# Patient Record
Sex: FEMALE | Race: WHITE | Employment: OTHER | URBAN - METROPOLITAN AREA
[De-identification: names, ages, dates, MRNs, and addresses within clinical notes are randomized per-mention and may not be internally consistent; named-entity substitution may affect disease eponyms.]

---

## 2017-02-10 ENCOUNTER — TRANSFERRED RECORDS (OUTPATIENT)
Dept: HEALTH INFORMATION MANAGEMENT | Facility: CLINIC | Age: 82
End: 2017-02-10

## 2017-08-23 DIAGNOSIS — I10 HTN, GOAL BELOW 150/90: ICD-10-CM

## 2017-08-23 NOTE — TELEPHONE ENCOUNTER
Medication Detail      Disp Refills Start End AYAN   amLODIPine (NORVASC) 10 MG tablet 90 tablet 3 8/16/2016  No   Sig: Take 1 tablet (10 mg) by mouth daily         Last Office Visit with FMAURELIA, JACQUELINE or [facility] prescribing provider:  8/2/16   Future Office Visit:        BP Readings from Last 3 Encounters:   08/02/16 126/74   04/07/16 183/80   07/09/15 159/86

## 2017-08-25 RX ORDER — AMLODIPINE BESYLATE 10 MG/1
TABLET ORAL
Qty: 30 TABLET | Refills: 0 | Status: SHIPPED | OUTPATIENT
Start: 2017-08-25 | End: 2017-08-25

## 2017-08-25 RX ORDER — AMLODIPINE BESYLATE 10 MG/1
10 TABLET ORAL DAILY
Qty: 90 TABLET | Refills: 0 | Status: SHIPPED | OUTPATIENT
Start: 2017-08-25 | End: 2017-12-04

## 2017-08-25 NOTE — TELEPHONE ENCOUNTER
Pended 90 day supply.  This is not on refill prot for 90 days.  Last ov was 8/2/17.  KEIRY Cardoza

## 2017-08-25 NOTE — TELEPHONE ENCOUNTER
Spoke with patient and she takes this medication along with another medication and that other one was filled for 90 tablets. Patient can't understand why the other would be filled for only 30 tablets.  She plains on coming in to be seen in October when the flu shot's are ready to be given and that is when she would plain on seeing Dr Villagran at that time, so she is asking if this amount that was filled of this medication can be changed to being 90 tablets. She has to rely on other people to get her to her appointments and this would be more convenient for her as far as getting someone to take her to her doctor's appointment in October sometime

## 2017-08-25 NOTE — TELEPHONE ENCOUNTER
Spoke with patient to let her know quantity changed for her from 30 to 90 pills to refill for her

## 2017-10-27 DIAGNOSIS — I10 ESSENTIAL HYPERTENSION WITH GOAL BLOOD PRESSURE LESS THAN 140/90: ICD-10-CM

## 2017-10-27 RX ORDER — LISINOPRIL 5 MG/1
5 TABLET ORAL DAILY
Qty: 90 TABLET | Refills: 0 | Status: SHIPPED | OUTPATIENT
Start: 2017-10-27 | End: 2020-12-15

## 2017-10-27 NOTE — TELEPHONE ENCOUNTER
Dr. Villagran-Please review and advise.  Writer planned on informing patient she is welcome to see any provider in clinic.    Thank you!  MAURO PenaN, RN

## 2017-10-27 NOTE — TELEPHONE ENCOUNTER
I've been recommending Dr. Joel Wegener MD and Lydia Jones NP, but certainly anyone here would be happy to see her and would take great care of her!    Sincerely,  Dr. Jo Ann Villagran MD  10/27/2017

## 2017-10-27 NOTE — TELEPHONE ENCOUNTER
Called patient and reviewed message per below from Dr. Villagran.    Patient verbalized understanding, stated she needs to check with her daughter, who provides ride to clinic and will then schedule with WEN Jones CNP.    Patient requesting 90 day refill for Lisinopril.  Writer unable to authorize as patient overdue for annual office visit.    Dr. Villagran-Please sign if agree and may close encounter.    Thank you!  ALVA Pena, RN    BP Readings from Last 6 Encounters:   08/02/16 126/74   04/07/16 183/80   07/09/15 159/86   05/30/14 150/78   12/18/13 150/70   10/03/13 140/82

## 2017-10-27 NOTE — TELEPHONE ENCOUNTER
Please call patient today.  Would like to know who you would recommend her to see while Dr Villagran on maternity leave.  OK to leave message on voicemail.

## 2017-12-04 DIAGNOSIS — I10 HTN, GOAL BELOW 150/90: ICD-10-CM

## 2017-12-05 RX ORDER — AMLODIPINE BESYLATE 10 MG/1
TABLET ORAL
Qty: 30 TABLET | Refills: 0 | Status: SHIPPED | OUTPATIENT
Start: 2017-12-05 | End: 2020-12-15

## 2017-12-05 NOTE — TELEPHONE ENCOUNTER
Medication Detail      Disp Refills Start End AYAN   amLODIPine (NORVASC) 10 MG tablet 90 tablet 0 8/25/2017  No   Sig: Take 1 tablet (10 mg) by mouth daily     lov 11/24/17

## 2017-12-05 NOTE — TELEPHONE ENCOUNTER
Last office visit 8/2/2016    Medication is being filled for 1 time refill only due to:  Patient needs labs Creatinine. Patient needs to be seen because BP check. Patient needs to be seen because it has been more than one year since last visit.     Signed Prescriptions:                        Disp   Refills    amLODIPine (NORVASC) 10 MG tablet          30 tab*0        Sig: TAKE ONE TABLET BY MOUTH EVERY DAY  Authorizing Provider: BRENTON KENNEDY  Ordering User: VERONIKA MCNEILL      Routing to  Reception - advise overdue for annual office visit    Thank you,  Veronika Mcneill RN

## 2017-12-06 NOTE — TELEPHONE ENCOUNTER
Spoke to patient and is aware she needs to schedule but has to figure out a ride first will call back

## 2020-12-15 ENCOUNTER — VIRTUAL VISIT (OUTPATIENT)
Dept: FAMILY MEDICINE | Facility: CLINIC | Age: 85
End: 2020-12-15
Payer: COMMERCIAL

## 2020-12-15 DIAGNOSIS — E55.9 VITAMIN D DEFICIENCY: ICD-10-CM

## 2020-12-15 DIAGNOSIS — R73.01 IMPAIRED FASTING GLUCOSE: ICD-10-CM

## 2020-12-15 DIAGNOSIS — R80.9 MICROALBUMINURIA: ICD-10-CM

## 2020-12-15 DIAGNOSIS — Z83.3 FAMILY HISTORY OF DIABETES MELLITUS: ICD-10-CM

## 2020-12-15 DIAGNOSIS — Z13.6 CARDIOVASCULAR SCREENING; LDL GOAL LESS THAN 160: ICD-10-CM

## 2020-12-15 DIAGNOSIS — I10 HTN, GOAL BELOW 150/90: ICD-10-CM

## 2020-12-15 DIAGNOSIS — Z00.00 ENCOUNTER FOR MEDICARE ANNUAL WELLNESS EXAM: Primary | ICD-10-CM

## 2020-12-15 PROCEDURE — G0439 PPPS, SUBSEQ VISIT: HCPCS | Mod: 95 | Performed by: FAMILY MEDICINE

## 2020-12-15 NOTE — PATIENT INSTRUCTIONS
Please schedule a fasting lab appointment at [facility], thank you!    Address: [address], [place] [address]    Phone: [phone]     Patient Education   Personalized Prevention Plan  You are due for the preventive services outlined below.  Your care team is available to assist you in scheduling these services.  If you have already completed any of these items, please share that information with your care team to update in your medical record.  Health Maintenance Due   Topic Date Due     Zoster (Shingles) Vaccine (1 of 2) 10/16/1979     Annual Wellness Visit  10/16/1994     A1C Lab  08/02/2017     Basic Metabolic Panel  08/02/2017     Cholesterol Lab  08/02/2017     Kidney Microalbumin Urine Test  08/02/2017     FALL RISK ASSESSMENT  08/02/2017     Discuss Advance Care Planning  11/09/2017     PHQ-2  01/01/2020     Flu Vaccine (1) 09/01/2020        Patient Education   Personalized Prevention Plan  You are due for the preventive services outlined below.  Your care team is available to assist you in scheduling these services.  If you have already completed any of these items, please share that information with your care team to update in your medical record.  Health Maintenance Due   Topic Date Due     Zoster (Shingles) Vaccine (1 of 2) 10/16/1979     A1C Lab  08/02/2017     Basic Metabolic Panel  08/02/2017     Cholesterol Lab  08/02/2017     Kidney Microalbumin Urine Test  08/02/2017     FALL RISK ASSESSMENT  08/02/2017     Discuss Advance Care Planning  11/09/2017     PHQ-2  01/01/2020     Flu Vaccine (1) 09/01/2020     Preventive Health Recommendations    See your health care provider every year to    Review health changes.     Discuss preventive care.      Review your medicines if your doctor has prescribed any.    You no longer need a yearly Pap test unless you've had an abnormal Pap test in the past 10 years. If you have vaginal symptoms, such as bleeding or discharge, be sure to talk  with your provider about a Pap test.    Every 1 to 2 years, have a mammogram.  If you are over 69, talk with your health care provider about whether or not you want to continue having screening mammograms.    Every 10 years, have a colonoscopy. Or, have a yearly FIT test (stool test). These exams will check for colon cancer.     Have a cholesterol test every 5 years, or more often if your doctor advises it.     Have a diabetes test (fasting glucose) every three years. If you are at risk for diabetes, you should have this test more often.     At age 65, have a bone density scan (DEXA) to check for osteoporosis (brittle bone disease).    Shots:    Get a flu shot each year.    Get a tetanus shot every 10 years.    Talk to your doctor about your pneumonia vaccines. There are now two you should receive - Pneumovax (PPSV 23) and Prevnar (PCV 13).    Talk to your pharmacist about the shingles vaccine.    Talk to your doctor about the hepatitis B vaccine.    Nutrition:     Eat at least 5 servings of fruits and vegetables each day.    Eat whole-grain bread, whole-wheat pasta and brown rice instead of white grains and rice.    Get adequate Calcium and Vitamin D.     Lifestyle    Exercise at least 150 minutes a week (30 minutes a day, 5 days a week). This will help you control your weight and prevent disease.    Limit alcohol to one drink per day.    No smoking.     Wear sunscreen to prevent skin cancer.     See your dentist twice a year for an exam and cleaning.    See your eye doctor every 1 to 2 years to screen for conditions such as glaucoma, macular degeneration and cataracts.    Personalized Prevention Plan  You are due for the preventive services outlined below.  Your care team is available to assist you in scheduling these services.  If you have already completed any of these items, please share that information with your care team to update in your medical record.  Health Maintenance   Topic Date Due     ZOSTER  IMMUNIZATION (1 of 2) 10/16/1979     A1C  08/02/2017     BMP  08/02/2017     LIPID  08/02/2017     MICROALBUMIN  08/02/2017     FALL RISK ASSESSMENT  08/02/2017     ADVANCE CARE PLANNING  11/09/2017     PHQ-2  01/01/2020     INFLUENZA VACCINE (1) 09/01/2020     MEDICARE ANNUAL WELLNESS VISIT  12/15/2021     DTAP/TDAP/TD IMMUNIZATION (2 - Td) 04/07/2026     Pneumococcal Vaccine: 65+ Years  Completed     Pneumococcal Vaccine: Pediatrics (0 to 5 Years) and At-Risk Patients (6 to 64 Years)  Aged Out     IPV IMMUNIZATION  Aged Out     MENINGITIS IMMUNIZATION  Aged Out     HEPATITIS B IMMUNIZATION  Aged Out

## 2020-12-15 NOTE — PROGRESS NOTES
"  SUBJECTIVE:   Radha Manning is a 89 year old female who presents for Preventive Visit.  This was done with a telephone visit.  Radha Manning is a 89 year old female who is being evaluated via a billable telephone visit.      The patient has been notified of following:     \"This telephone visit will be conducted via a call between you and your physician/provider. We have found that certain health care needs can be provided without the need for a physical exam.  This service lets us provide the care you need with a short phone conversation.  If a prescription is necessary we can send it directly to your pharmacy.  If lab work is needed we can place an order for that and you can then stop by our lab to have the test done at a later time.    Telephone visits are billed at different rates depending on your insurance coverage. During this emergency period, for some insurers they may be billed the same as an in-person visit.  Please reach out to your insurance provider with any questions.    If during the course of the call the physician/provider feels a telephone visit is not appropriate, you will not be charged for this service.\"    Patient has given verbal consent for Telephone visit?  Yes        Patient has been advised of split billing requirements and indicates understanding: Yes  Are you in the first 12 months of your Medicare Part B coverage?  No    Her son Yunier provides corroborating information today.    Physical Health:    In general, how would you rate your overall physical health? Fair    Still lives by herself in the same house she's had for more than 60 years    She does feel her balance has deteriorated a little, she likes to put her hand on a table or chair as she moves around her house    She fell 6 weeks ago, fell while reaching for something on her armoire in the middle of the night, she had trouble getting back up. She has a life alert necklace, paramedics came to her house and helped her up, she " "wasn't hurt. She didn't have any injuries.    Outside of work, how many days during the week do you exercise? none    Outside of work, approximately how many minutes a day do you exercise?less than 15 minutes    If you drink alcohol do you typically have >3 drinks per day or >7 drinks per week? No    Do you usually eat at least 4 servings of fruit and vegetables a day, include whole grains & fiber and avoid regularly eating high fat or \"junk\" foods? Yes    Do you have any problems taking medications regularly?  No    Do you have any side effects from medications? none    Needs assistance for the following daily activities: shopping and housework    Which of the following safety concerns are present in your home?  none identified     Hearing impairment: Yes, Need to ask people to speak up or repeat themselves.    Mental Health:    In general, how would you rate your overall mental or emotional health? good  PHQ-2 Score:      Do you feel safe in your environment? Yes    Have you ever done Advance Care Planning? (For example, a Health Directive, POLST, or a discussion with a medical provider or your loved ones about your wishes): Yes, advance care planning is on file.    Fall risk:  Fallen 2 or more times in the past year?: No  Any fall with injury in the past year?: No    Cognitive Screening:   No memory deficits reported by son, she pays her own bills, no financial errors, concerns about money management    Mini-CogTM Copyright AMELIA Colón. Licensed by the author for use in [facility]; reprinted with permission (hao@[facility]). All rights reserved.      Impaired fasting glucose Follow-up      Patient is checking blood sugars: not at all    Diabetic concerns: None     Symptoms of hypoglycemia (low blood sugar): none     Paresthesias (numbness or burning in feet) or sores: No      Lab Results   Component Value Date    A1C 5.4 08/02/2016              Hyperlipidemia Follow-Up      Are you regularly taking any " medication or supplement to lower your cholesterol?   No    Are you having muscle aches or other side effects that you think could be caused by your cholesterol lowering medication?  NA    Hypertension Follow-up      Do you check your blood pressure regularly outside of the clinic? No     Are you following a low salt diet? Yes    Are your blood pressures ever more than 140 on the top number (systolic) OR more   than 90 on the bottom number (diastolic), for example 140/90? No    BP Readings from Last 2 Encounters:   08/02/16 126/74   04/07/16 183/80     Hemoglobin A1C (%)   Date Value   08/02/2016 5.4     LDL Cholesterol Calculated (mg/dL)   Date Value   08/02/2016 128 (H)   07/09/2015 115       Chronic Kidney Disease Follow-up; microalbumuria      Do you take any over the counter pain medicine?: aspirin and tylenol daily, no nsaids  GFR Estimate   Date Value Ref Range Status   08/02/2016 84 >60 mL/min/1.7m2 Final     Comment:     Non  GFR Calc   07/09/2015 >90  Non  GFR Calc   >60 mL/min/1.7m2 Final   08/01/2013 80 >60 mL/min/1.7m2 Final         Reviewed and updated as needed this visit by clinical staff   Allergies  Meds  Problems             Reviewed and updated as needed this visit by Provider   Allergies  Meds  Problems            Social History     Tobacco Use     Smoking status: Never Smoker     Smokeless tobacco: Never Used   Substance Use Topics     Alcohol use: Yes     Comment: 2 times a year a glass of wine                           Current providers sharing in care for this patient include:   Patient Care Team:  Jo Ann Villagran MD as PCP - General (Family Practice)    The following health maintenance items are reviewed in Epic and correct as of today:  Health Maintenance   Topic Date Due     ZOSTER IMMUNIZATION (1 of 2) 10/16/1979     A1C  08/02/2017     BMP  08/02/2017     LIPID  08/02/2017     MICROALBUMIN  08/02/2017     FALL RISK ASSESSMENT  08/02/2017      ADVANCE CARE PLANNING  2017     INFLUENZA VACCINE (1) 2020     MEDICARE ANNUAL WELLNESS VISIT  12/15/2021     DTAP/TDAP/TD IMMUNIZATION (2 - Td) 2026     PHQ-2  Completed     Pneumococcal Vaccine: 65+ Years  Completed     Pneumococcal Vaccine: Pediatrics (0 to 5 Years) and At-Risk Patients (6 to 64 Years)  Aged Out     IPV IMMUNIZATION  Aged Out     MENINGITIS IMMUNIZATION  Aged Out     HEPATITIS B IMMUNIZATION  Aged Out     BP Readings from Last 3 Encounters:   16 126/74   16 183/80   07/09/15 159/86    Wt Readings from Last 3 Encounters:   16 53.5 kg (118 lb)   16 54 kg (119 lb)   07/09/15 54.9 kg (121 lb)                  Patient Active Problem List   Diagnosis     Osteoporosis     Insomnia     Malignant neoplasm of colon (H)     Family history of diabetes mellitus     Unspecified visual loss     CARDIOVASCULAR SCREENING; LDL GOAL LESS THAN 160     Blindness     Ulcer of toe (H)     Hard of hearing, unspecified laterality     Impaired fasting glucose     Vitamin D deficiency     Microalbuminuria     HTN, goal below 150/90     Primary osteoarthritis of left knee     Past Surgical History:   Procedure Laterality Date     ZZC NONSPECIFIC PROCEDURE      colon ca resection       Social History     Tobacco Use     Smoking status: Never Smoker     Smokeless tobacco: Never Used   Substance Use Topics     Alcohol use: Yes     Comment: 2 times a year a glass of wine     Family History   Problem Relation Age of Onset     Hypertension Mother          93     Cancer - colorectal Father      Cancer - colorectal Sister      Diabetes Sister         +cardiomyopathy         Current Outpatient Medications   Medication Sig Dispense Refill     acetaminophen (TYLENOL) 500 MG tablet Take 2 tablets (1,000 mg) by mouth At Bedtime 100 tablet 0     ASPIRIN 81 MG OR CPEP 1 CAPSULE DAILY 30 0     Calcium-Vitamin D (CALCIUM + D PO) Take 2 tablets by mouth daily.       MULTI-VITAMIN OR TABS Take by  "mouth 3 times daily.   0     ORDER FOR DME Equipment being ordered: post op shoe 1 Device 0     fluorometholone (FML LIQUIFILM) 0.1 % ophthalmic suspension 1 drop daily        No Known Allergies  Recent Labs   Lab Test 08/02/16  0917 07/09/15  0934 08/01/13  0901 11/09/12  0846   A1C 5.4  --   --   --    * 115  --  137*   HDL 75 87  --  81   TRIG 73 70  --  74   ALT 15 20 20 7   CR 0.67 0.58 0.70 0.64   GFRESTIMATED 84 >90  Non  GFR Calc   80 89   GFRESTBLACK >90   GFR Calc   >90   GFR Calc   >90 >90   POTASSIUM 3.8 3.7 4.1 3.8   TSH  --  1.31  --   --       ROS:  Constitutional, HEENT, cardiovascular, pulmonary, GI, , musculoskeletal, neuro, skin, endocrine and psych systems are negative, except as otherwise noted.    OBJECTIVE:   There were no vitals taken for this visit. Estimated body mass index is 23.83 kg/m  as calculated from the following:    Height as of 4/7/16: 1.499 m (4' 11\").    Weight as of 8/2/16: 53.5 kg (118 lb).  EXAM:   GENERAL: healthy, alert and no distress  RESP: no increased work of breathing, able to talk in complete sentences  NEURO: mentation intact and oriented times three  PSYCH: mentation appears normal, affect normal/bright and judgement and insight intact    Diagnostic Test Results:  Labs reviewed in Epic    ASSESSMENT / PLAN:   1. Encounter for Medicare annual wellness exam  Routine, very healthy    2. Family history of diabetes mellitus  - Hemoglobin A1c; Future    3. CARDIOVASCULAR SCREENING; LDL GOAL LESS THAN 160  - Lipid panel reflex to direct LDL Fasting; Future    4. Impaired fasting glucose  - Hemoglobin A1c; Future    5. Vitamin D deficiency  - Vitamin D Deficiency; Future    6. Microalbuminuria  - Comprehensive metabolic panel; Future  - Albumin Random Urine Quantitative with Creat Ratio; Future    7. HTN, goal below 150/90  BP Readings from Last 3 Encounters:   08/02/16 126/74   04/07/16 183/80   07/09/15 159/86    at " "goal, but not checked in over 4 years, recommended checking at home  - Comprehensive metabolic panel; Future  - Albumin Random Urine Quantitative with Creat Ratio; Future    Patient has been advised of split billing requirements and indicates understanding: Yes    COUNSELING:  Reviewed preventive health counseling, as reflected in patient instructions    Estimated body mass index is 23.83 kg/m  as calculated from the following:    Height as of 4/7/16: 1.499 m (4' 11\").    Weight as of 8/2/16: 53.5 kg (118 lb).        She reports that she has never smoked. She has never used smokeless tobacco.       Phone call duration: 22 minutes    Jo Ann Villagran MD        Appropriate preventive services were discussed with this patient, including applicable screening as appropriate for cardiovascular disease, diabetes, osteopenia/osteoporosis, and glaucoma.  As appropriate for age/gender, discussed screening for colorectal cancer, prostate cancer, breast cancer, and cervical cancer. Checklist reviewing preventive services available has been given to the patient.    Reviewed patients plan of care and provided an AVS. The Intermediate Care Plan ( asthma action plan, low back pain action plan, and migraine action plan) for Radha meets the Care Plan requirement. This Care Plan has been established and reviewed with the Patient.    Counseling Resources:  ATP IV Guidelines  Pooled Cohorts Equation Calculator  Breast Cancer Risk Calculator  BRCA-Related Cancer Risk Assessment: FHS-7 Tool  FRAX Risk Assessment  ICSI Preventive Guidelines  Dietary Guidelines for Americans, 2010  USDA's MyPlate  ASA Prophylaxis  Lung CA Screening    Jo Ann Villagran MD  [facility]  "

## 2020-12-15 NOTE — PROGRESS NOTES
"Radha Manning is a 89 year old female who is being evaluated via a billable video visit.      The patient has been notified of following:     \"This video visit will be conducted via a call between you and your physician/provider. We have found that certain health care needs can be provided without the need for an in-person physical exam.  This service lets us provide the care you need with a video conversation.  If a prescription is necessary we can send it directly to your pharmacy.  If lab work is needed we can place an order for that and you can then stop by our lab to have the test done at a later time.    Video visits are billed at different rates depending on your insurance coverage.  Please reach out to your insurance provider with any questions.    If during the course of the call the physician/provider feels a video visit is not appropriate, you will not be charged for this service.\"    Patient has given verbal consent for Video visit? Yes  How would you like to obtain your AVS? Mail a copy  If you are dropped from the video visit, the video invite should be resent to: Text to cell phone: [phone]  Will anyone else be joining your video visit? No    Subjective     Radha Manning is a 89 year old female who presents today via video visit for the following health issues:    HPI     Annual Wellness Visit    Patient has been advised of split billing requirements and indicates understanding: Yes     Are you in the first 12 months of your Medicare Part B coverage?  No    Physical Health:    In general, how would you rate your overall physical health? excellent    Outside of work, how many days during the week do you exercise?6-7 days/week    Outside of work, approximately how many minutes a day do you exercise?less than 15 minutes    If you drink alcohol do you typically have >3 drinks per day or >7 drinks per week? No    Do you usually eat at least 4 servings of fruit and vegetables a day, include whole " "grains & fiber and avoid regularly eating high fat or \"junk\" foods? Yes    Do you have any problems taking medications regularly? No    Do you have any side effects from medications? none    Needs assistance for the following daily activities: shopping    Which of the following safety concerns are present in your home?  none identified     Hearing impairment: Yes, Difficulty following a conversation in a noisy restaurant or crowded room.    Feel that people are mumbling or not speaking clearly.    Need to ask people to speak up or repeat themselves.    Difficulty understanding speech on the telephone.    In the past 6 months, have you been bothered by leaking of urine? yes    There were no vitals taken for this visit.    Mental Health:    In general, how would you rate your overall mental or emotional health? excellent  PHQ-2 Score:  0    Do you feel safe in your environment? No   0  "

## 2020-12-22 ENCOUNTER — TELEPHONE (OUTPATIENT)
Dept: FAMILY MEDICINE | Facility: CLINIC | Age: 85
End: 2020-12-22

## 2020-12-22 DIAGNOSIS — H54.7 UNSPECIFIED VISUAL LOSS: ICD-10-CM

## 2020-12-22 DIAGNOSIS — R73.01 IMPAIRED FASTING GLUCOSE: ICD-10-CM

## 2020-12-22 DIAGNOSIS — M17.12 PRIMARY OSTEOARTHRITIS OF LEFT KNEE: ICD-10-CM

## 2020-12-22 DIAGNOSIS — I10 HTN, GOAL BELOW 150/90: ICD-10-CM

## 2020-12-22 DIAGNOSIS — H54.7 BLINDNESS: ICD-10-CM

## 2020-12-22 DIAGNOSIS — M81.0 OSTEOPOROSIS: ICD-10-CM

## 2020-12-22 DIAGNOSIS — H91.90 HEARING LOSS: ICD-10-CM

## 2020-12-22 DIAGNOSIS — Z13.6 CARDIOVASCULAR SCREENING; LDL GOAL LESS THAN 160: Primary | ICD-10-CM

## 2020-12-22 DIAGNOSIS — E55.9 VITAMIN D DEFICIENCY: ICD-10-CM

## 2020-12-22 NOTE — TELEPHONE ENCOUNTER
SOn states that patient needs blood work but refuses to leave the house and wonders if there is a way she can get orders for a home care nurse to come out to the house and do it.  Please call.    Thank you.

## 2020-12-22 NOTE — TELEPHONE ENCOUNTER
Hi, yes, that would be great if someone could go draw blood, future orders already placed for vitamin D, lipid, CMP, and HgA1C. She also needs urine microalbumin check.    Sincerely,  Dr. Jo Ann Villagran MD  12/22/2020

## 2020-12-31 ENCOUNTER — PATIENT OUTREACH (OUTPATIENT)
Dept: BEHAVIORAL HEALTH | Facility: CLINIC | Age: 85
End: 2020-12-31

## 2020-12-31 NOTE — PROGRESS NOTES
The Community Paramedic left a message for a call back about a blood draw.  Will try again in 1-2 business days.    Krystle BAEZA, SABIHAP  Community Paramedic  Phone number [phone]  Email Sophia [email or web address]

## 2021-01-04 ENCOUNTER — PATIENT OUTREACH (OUTPATIENT)
Dept: BEHAVIORAL HEALTH | Facility: CLINIC | Age: 86
End: 2021-01-04

## 2021-01-04 NOTE — PROGRESS NOTES
Called and left second message for a call back to schedule Community Paramedic appointment.  Will reach out again tomorrow.    Krystle BAEZA, NRP  Community Paramedic  Phone number [phone]  Email Sophia [email or web address]

## 2021-01-05 ENCOUNTER — PATIENT OUTREACH (OUTPATIENT)
Dept: BEHAVIORAL HEALTH | Facility: CLINIC | Age: 86
End: 2021-01-05

## 2021-01-05 NOTE — PROGRESS NOTES
A message was left for a call back for the pt son about scheduling his mother for a blood draw. Will reach out again in 1-2 business days.    Krystle BAEZA, P  Community Paramedic  Phone number [phone]  Email Sophia [email or web address]

## 2021-01-07 ENCOUNTER — PATIENT OUTREACH (OUTPATIENT)
Dept: BEHAVIORAL HEALTH | Facility: CLINIC | Age: 86
End: 2021-01-07

## 2021-01-07 NOTE — PROGRESS NOTES
4th reach out to schedule appointment for blood draw.  3 calls to the pt and 1 to her son.  No more attempts will be made and the pt will be closed out. Please send another referral as needed.    Krystle BAEZA, SABIHAP  Community Paramedic  Phone number [phone]  Email Sophia [email or web address]

## 2021-08-11 ENCOUNTER — NURSE TRIAGE (OUTPATIENT)
Dept: NURSING | Facility: CLINIC | Age: 86
End: 2021-08-11

## 2021-08-11 NOTE — TELEPHONE ENCOUNTER
Home care agency requesting Fax number in order to fax form to request orders.    Fax number given to the [facility].    Shama Ely RN  [place] Nurse Advisor  3:00 PM  8/11/2021

## 2021-08-13 ENCOUNTER — TELEPHONE (OUTPATIENT)
Dept: FAMILY MEDICINE | Facility: CLINIC | Age: 86
End: 2021-08-13

## 2021-08-13 NOTE — TELEPHONE ENCOUNTER
Nurse has not received orders that were faxed to the clinic for start of care.   Trx to  to assist.       Sussy Whitlock RN   [facility]

## 2021-08-17 ENCOUNTER — VIRTUAL VISIT (OUTPATIENT)
Dept: FAMILY MEDICINE | Facility: CLINIC | Age: 86
End: 2021-08-17
Payer: COMMERCIAL

## 2021-08-17 DIAGNOSIS — Z13.6 CARDIOVASCULAR SCREENING; LDL GOAL LESS THAN 160: ICD-10-CM

## 2021-08-17 DIAGNOSIS — R54 FRAIL ELDERLY: ICD-10-CM

## 2021-08-17 DIAGNOSIS — I10 HTN, GOAL BELOW 150/90: Primary | ICD-10-CM

## 2021-08-17 DIAGNOSIS — R73.01 IMPAIRED FASTING GLUCOSE: ICD-10-CM

## 2021-08-17 PROCEDURE — 99213 OFFICE O/P EST LOW 20 MIN: CPT | Mod: 95 | Performed by: FAMILY MEDICINE

## 2021-08-17 NOTE — PROGRESS NOTES
Radha is a 89 year old who is being evaluated via a billable video visit.      How would you like to obtain your AVS? Mail a copy  If the video visit is dropped, the invitation should be resent by: Send to e-mail at: [email or web address]  Will anyone else be joining your video visit? No  Video Start Time: 2:24 PM    Assessment & Plan     HTN, goal below 150/90  At goal  - Basic metabolic panel; Future  - Albumin Random Urine Quantitative with Creat Ratio; Future    CARDIOVASCULAR SCREENING; LDL GOAL LESS THAN 160  Return to clinic for fasting labs  - Lipid panel reflex to direct LDL Fasting; Future    Impaired fasting glucose  - Hemoglobin A1c; Future    Frail elderly  Son Penelope reports increasing difficulty managing hygiene at home, needing more assistance. They have found [facility] services, which provides physical therapy, OT, PCA and RN services. I do recommend this and wrote letter in support see letter.    Motivational Interviewing  Target Behavior: encouraged getting Covid vaccine    Stage of Change: PRECONTEMPLATION (Not seeing need for change)    MI Intervention: Permission to raise concern or advise and Reflections: simple and complex     I discussed the high risk of covid, to patient and to family members and loved ones, that they could contract covid and die, have a long term serious illness, and permanent post covid, and they could be responsible for passing a lethal case of covid to a loved one. I discussed patient who lost leg above the knee due to covid related sepsis, and many, many patients who have been intubated for weeks with long term, slow recovery, including healthy young people. For Radha, especially, at 89, taz serious covid could lead to some pretty serious decisions about whether to intubate or not.    I strongly encouraged Radha and all eligible family members to get vaccinated against covid.     No follow-ups on file.    Jo Ann Villagran MD  [facility]  [facility]   Radha is a 89 year old who presents for the following health issues     HPI     Frail Elderly  Radha is a 89 year old female who is having more trouble with some ADLs including bathing, dressing safely, and home cares. Her son would like her to have get some services with [facility] who provide nursing and PCA services.    Hypertension Follow-up      Do you check your blood pressure regularly outside of the clinic? No     Are you following a low salt diet? No    Are your blood pressures ever more than 140 on the top number (systolic) OR more   than 90 on the bottom number (diastolic), for example 140/90? No    Chronic Kidney Disease Follow-up    Do you take any over the counter pain medicine?: Yes  What over the counter medicine are you taking for your pain?:  Tylenol and Aspirin       How often do you take this medicine?:  One time daily      How many servings of fruits and vegetables do you eat daily?  2-3    On average, how many sweetened beverages do you drink each day (Examples: soda, juice, sweet tea, etc.  Do NOT count diet or artificially sweetened beverages)?   1    How many days per week do you exercise enough to make your heart beat faster? 3 or less    How many minutes a day do you exercise enough to make your heart beat faster? 9 or less    How many days per week do you miss taking your medication? 0        Review of Systems   Constitutional, HEENT, cardiovascular, pulmonary, GI, , musculoskeletal, neuro, skin, endocrine and psych systems are negative, except as otherwise noted.      Objective           Vitals:  No vitals were obtained today due to virtual visit.    Physical Exam   GENERAL: healthy, alert, no distress, frail and elderly  EYES: Eyes grossly normal to inspection.  No discharge or erythema, or obvious scleral/conjunctival abnormalities.  RESP: No audible wheeze, cough, or visible cyanosis.  No visible retractions or increased work of breathing.    SKIN:  Visible skin clear. No significant rash, abnormal pigmentation or lesions.  NEURO: resting tremor mouth, alert and orientated, son Yunier provides most of the history  PSYCH: affect normal/bright                Video-Visit Details    Type of service:  Video Visit    Video End Time:2:56 PM    Originating Location (pt. Location): Home    Distant Location (provider location):  [facility]     Platform used for Video Visit: [id number]

## 2021-08-17 NOTE — LETTER
[facility]  [address], [place] [address]  Phone: [phone]      August 17, 2021    SIS MANNING  [address] [place] [id number]    To Whom it May Concern,    Sis Manning is a 89 year female who is frail and having difficulty managing ADLs including bathing and dressing. She is at risk for injury from falls. Please assess to see for PCA, OT and physical therapy services in her home for personal hygiene care, and other services needed for home safety and health.    Sincerely,    Sincerely,  Dr. Jo Ann Villagran MD  8/17/2021

## 2021-08-19 ENCOUNTER — TELEPHONE (OUTPATIENT)
Dept: FAMILY MEDICINE | Facility: CLINIC | Age: 86
End: 2021-08-19

## 2021-08-19 NOTE — TELEPHONE ENCOUNTER
"No consent to communicate on file .    Daughter Damaris  called . She just picked up the form from clinic today for consent to communicate    1.Issue her mom has is her anxiety, it has gotten worse, it is accelerated, \"more intense than it was\" \"even to go outside \" it is difficult. She admitted to her daughter that she has anxiety.     She is wondering if they can get any anxiety medication for her. She knows her brother didn't mention the anxiety at the visit yesterday.     Advised daughter to do phone visit with Dr Villagran and her mother and this was scheduled for next week. They can discuss this directly with DR Villagran.       2. Also her mom asked for covid vaccine a couple months ago. She is wondering if a visiting nurses are doing this in the home     Daughter advised call the [facility] COVID-19 Public Hotline at 1-806.656.6926 for more vaccine options rather than this clinic or pharmacy.     Charlene Gayle RN, BSN  [facility]           "

## 2021-08-19 NOTE — TELEPHONE ENCOUNTER
Left message on [facility] RN voicemail giving verbal authorization for requested home care orders.    ALVA Hardwick, RN  [facility]

## 2021-08-19 NOTE — TELEPHONE ENCOUNTER
Reason for Call: Other order    Detailed comments: Verbal orders for skilled nursing and OT. Please follow up. Thanks!    Phone Number Patient can be reached at: [phone]    Best Time: Any    Can we leave a detailed message on this number? YES    Call taken on 8/19/2021 at 3:21 PM by Monae Nina

## 2021-08-19 NOTE — TELEPHONE ENCOUNTER
Reason for Call: Other call back    Detailed comments: Patients daughter is requesting a call back from PCP. States she is concerned about her mom's anxiety and would like to relay some information. Please assist. Thanks!    Phone Number Patient can be reached at: [phone]    Best Time: Any    Can we leave a detailed message on this number? YES    Call taken on 8/19/2021 at 12:32 PM by Monae Nina

## 2021-08-23 ENCOUNTER — TELEPHONE (OUTPATIENT)
Dept: FAMILY MEDICINE | Facility: CLINIC | Age: 86
End: 2021-08-23

## 2021-08-23 ENCOUNTER — MEDICAL CORRESPONDENCE (OUTPATIENT)
Dept: HEALTH INFORMATION MANAGEMENT | Facility: CLINIC | Age: 86
End: 2021-08-23
Payer: COMMERCIAL

## 2021-08-23 NOTE — TELEPHONE ENCOUNTER
Reason for Call:  Home Health Care    Melany with [facility] called regarding (reason for call): orders    Orders are needed for this patient. Delay and start home care on today 8-23-21 starting at 3:00 pm     PT: yes    OT: yes    Skilled Nursing: yes     Pt Provider: Dr Villagran    Phone Number Homecare Nurse can be reached at: [phone]    Can we leave a detailed message on this number? YES    Phone number patient can be reached at:     Best Time:     Call taken on 8/23/2021 at 8:21 AM by Steffanie Garcia

## 2021-08-23 NOTE — TELEPHONE ENCOUNTER
I gave the order for delay of care.  This was because they couldn't get a hold of family.    (Per medicare, if visit is done 48 hours after the order was received, they need an order for delay of care.)  KEIRY Cardoza

## 2021-08-25 ENCOUNTER — TELEPHONE (OUTPATIENT)
Dept: FAMILY MEDICINE | Facility: CLINIC | Age: 86
End: 2021-08-25

## 2021-08-25 NOTE — TELEPHONE ENCOUNTER
Reason for Call:  Home Health Care    Nellie with [facility] called regarding (reason for call): orders    Orders are needed for this patient. orders    PT:     OT: for 1x a week for 3 weeks to address cognition, home safety, equipment needs,falls prevention, and care giver education    Skilled Nursing:     Pt Provider: Dr Villagran    Phone Number Homecare Nurse can be reached at: [phone]    Can we leave a detailed message on this number? YES    Phone number patient can be reached at:     Best Time:     Call taken on 8/25/2021 at 12:16 PM by Steffanie Garcia

## 2021-08-27 NOTE — TELEPHONE ENCOUNTER
8/17/21 was last virtual visit with PCP.  Gave VO on these orders by detailed VM.  KEIRY Cardoza

## 2021-09-01 ENCOUNTER — TELEPHONE (OUTPATIENT)
Dept: FAMILY MEDICINE | Facility: CLINIC | Age: 86
End: 2021-09-01

## 2021-09-01 NOTE — TELEPHONE ENCOUNTER
Reason for Call: Needs Verbal Order     Order or referral being requested: Physical Therapy for strength, balance, and safety awareness, once a week for four weeks.    Date needed: By 9/3/21    Has the patient been seen by the PCP for this problem? Yes    Additional comments: None    Phone number Patient can be reached at:  [phone], [facility]    Best Time:  Anytime    Can we leave a detailed message on this number?  Yes    Call taken on 9/1/2021 at 5:08 PM by Saran Martinez

## 2021-09-02 NOTE — TELEPHONE ENCOUNTER
Writer called [facility], spoke with RNPatricia: gave verbal authorization for requested home care orders.    ALVA Hardwick, RN  [facility]

## 2021-09-04 ENCOUNTER — HEALTH MAINTENANCE LETTER (OUTPATIENT)
Age: 86
End: 2021-09-04

## 2021-09-09 ENCOUNTER — TELEPHONE (OUTPATIENT)
Dept: FAMILY MEDICINE | Facility: CLINIC | Age: 86
End: 2021-09-09

## 2021-09-09 NOTE — TELEPHONE ENCOUNTER
Reason for Call: Other order    Detailed comments: [facility] is requesting a verbal okay for social work eval. Please follow up. Thanks!     Phone Number Patient can be reached at: [phone] Deysi    Best Time: Any    Can we leave a detailed message on this number? YES    Call taken on 9/9/2021 at 12:27 PM by Monae Nina

## 2021-09-09 NOTE — TELEPHONE ENCOUNTER
Message left on Deysi's identified voicemail giving verbal authorization for requested home care order.    MAURO HardwickN, RN  [facility]

## 2021-09-10 DIAGNOSIS — Z53.9 DIAGNOSIS NOT YET DEFINED: Primary | ICD-10-CM

## 2021-09-10 PROCEDURE — G0180 MD CERTIFICATION HHA PATIENT: HCPCS | Performed by: FAMILY MEDICINE

## 2021-09-13 ENCOUNTER — TELEPHONE (OUTPATIENT)
Dept: FAMILY MEDICINE | Facility: CLINIC | Age: 86
End: 2021-09-13

## 2021-09-13 NOTE — TELEPHONE ENCOUNTER
Reason for Call:  Form, our goal is to have forms completed with 72 hours, however, some forms may require a visit or additional information.    Type of letter, form or note:  Home Health Certification    Who is the form from?: Home care    Where did the form come from: form was faxed in    What clinic location was the form placed at?: [facility]    Where the form was placed: placed in pod C providers signature folder Box/Folder    What number is listed as a contact on the form?: [phone]       Additional comments:     Call taken on 9/13/2021 at 11:47 AM by Steffanie Garcia

## 2021-09-27 ENCOUNTER — TELEPHONE (OUTPATIENT)
Dept: FAMILY MEDICINE | Facility: CLINIC | Age: 86
End: 2021-09-27

## 2021-09-27 NOTE — TELEPHONE ENCOUNTER
CS,   FYI:  Gave ok for below orders   Skilled nursing - orders to see and discharge  PT - orders to see and discharge  KEIRY Gilmore [phone]

## 2021-09-27 NOTE — TELEPHONE ENCOUNTER
Deysi from [email or web address].  She had called this morning to discharge the pt from home care. She is changing the plan.    She would like VO for the following-  1-portable ultrasound of bilat legs.  Swelling and bruising on both legs. More on left. Right leg is heavy per [t complaints.    2- would also like verbal order to do Debrox for ear wax. bilat ears.  Sending to Dr. Villagran for approval.      I approved home care orders-  Skilled nursing once per week for 2 weeks.  HHA once per week for 2 weeks.  OT steven Cardoza RN

## 2021-09-28 NOTE — TELEPHONE ENCOUNTER
Called [facility].  Gave the VO for the requested orders-  Portable bilat ultrasound and Debrox for ear wax to bilat ears.  KEIRY Cardoza

## 2021-09-29 ENCOUNTER — TRANSFERRED RECORDS (OUTPATIENT)
Dept: HEALTH INFORMATION MANAGEMENT | Facility: CLINIC | Age: 86
End: 2021-09-29

## 2021-10-05 ENCOUNTER — TELEPHONE (OUTPATIENT)
Dept: FAMILY MEDICINE | Facility: CLINIC | Age: 86
End: 2021-10-05

## 2021-10-05 NOTE — TELEPHONE ENCOUNTER
Mariam [phone] - [facility] requesting orders for OT hayleyal      Charlotte Huertas RN  [place]

## 2021-10-15 ENCOUNTER — TELEPHONE (OUTPATIENT)
Dept: FAMILY MEDICINE | Facility: CLINIC | Age: 86
End: 2021-10-15

## 2021-10-15 NOTE — TELEPHONE ENCOUNTER
Home care OT calling to request verbal orders:    OT: 5 visits in 30 days for cognitive assessment, caregiver education, ADL safety    Verbal OK given    Karen Austin RN  [facility]

## 2021-10-18 ENCOUNTER — TELEPHONE (OUTPATIENT)
Dept: FAMILY MEDICINE | Facility: CLINIC | Age: 86
End: 2021-10-18

## 2021-10-18 NOTE — TELEPHONE ENCOUNTER
Deysi from [facility] calling. She is going out to see her for the last time this Friday, 10/22/2021 and she is wanting to know if you would like any labs done. I see there are future lab orders so I gave Deysi a verbal order on what labs are needed to be done. Deysi also wanted to let you know Friday is there last visit with Radha.      Agnes Gonzalez RN

## 2021-10-19 NOTE — TELEPHONE ENCOUNTER
Thank you!  I agree with assessment and plan below, thanks!  Jo Ann Villagran MD  10/19/2021      Health Maintenance Due   Topic Date Due     COVID-19 Vaccine (1) Never done     ZOSTER IMMUNIZATION (1 of 2) Never done     A1C  08/02/2017     BMP  08/02/2017     LIPID  08/02/2017     MICROALBUMIN  08/02/2017     PHQ-2  01/01/2021     INFLUENZA VACCINE (1) 09/01/2021

## 2022-01-01 ENCOUNTER — TELEPHONE (OUTPATIENT)
Dept: NURSING | Facility: CLINIC | Age: 87
End: 2022-01-01
Payer: COMMERCIAL

## 2022-01-01 ENCOUNTER — TELEPHONE (OUTPATIENT)
Dept: FAMILY MEDICINE | Facility: CLINIC | Age: 87
End: 2022-01-01

## 2022-01-01 ENCOUNTER — TELEPHONE (OUTPATIENT)
Dept: FAMILY MEDICINE | Facility: CLINIC | Age: 87
End: 2022-01-01
Payer: COMMERCIAL

## 2022-01-01 ENCOUNTER — LAB (OUTPATIENT)
Dept: LAB | Facility: CLINIC | Age: 87
End: 2022-01-01
Payer: COMMERCIAL

## 2022-01-01 ENCOUNTER — HEALTH MAINTENANCE LETTER (OUTPATIENT)
Age: 87
End: 2022-01-01

## 2022-01-01 ENCOUNTER — VIRTUAL VISIT (OUTPATIENT)
Dept: FAMILY MEDICINE | Facility: CLINIC | Age: 87
End: 2022-01-01
Payer: COMMERCIAL

## 2022-01-01 ENCOUNTER — MYC MEDICAL ADVICE (OUTPATIENT)
Dept: FAMILY MEDICINE | Facility: CLINIC | Age: 87
End: 2022-01-01
Payer: COMMERCIAL

## 2022-01-01 ENCOUNTER — TELEPHONE (OUTPATIENT)
Dept: NURSING | Facility: CLINIC | Age: 87
End: 2022-01-01

## 2022-01-01 ENCOUNTER — DOCUMENTATION ONLY (OUTPATIENT)
Dept: OTHER | Facility: CLINIC | Age: 87
End: 2022-01-01

## 2022-01-01 DIAGNOSIS — Z71.0 ENCOUNTER FOR FAMILY CONFERENCE WITHOUT PATIENT PRESENT: ICD-10-CM

## 2022-01-01 DIAGNOSIS — I10 HTN, GOAL BELOW 150/90: ICD-10-CM

## 2022-01-01 DIAGNOSIS — R80.9 MICROALBUMINURIA: ICD-10-CM

## 2022-01-01 DIAGNOSIS — Z13.6 CARDIOVASCULAR SCREENING; LDL GOAL LESS THAN 160: ICD-10-CM

## 2022-01-01 DIAGNOSIS — E55.9 VITAMIN D DEFICIENCY: ICD-10-CM

## 2022-01-01 DIAGNOSIS — R73.01 IMPAIRED FASTING GLUCOSE: ICD-10-CM

## 2022-01-01 DIAGNOSIS — Z13.6 CARDIOVASCULAR SCREENING; LDL GOAL LESS THAN 160: Primary | ICD-10-CM

## 2022-01-01 DIAGNOSIS — R63.4 WEIGHT LOSS: Primary | ICD-10-CM

## 2022-01-01 DIAGNOSIS — F01.50 VASCULAR DEMENTIA WITHOUT BEHAVIORAL DISTURBANCE (H): ICD-10-CM

## 2022-01-01 LAB
ALBUMIN SERPL-MCNC: 4.1 G/DL (ref 3.4–5)
ALP SERPL-CCNC: 96 U/L (ref 40–150)
ALT SERPL W P-5'-P-CCNC: 17 U/L (ref 0–50)
ANION GAP SERPL CALCULATED.3IONS-SCNC: 9 MMOL/L (ref 3–14)
AST SERPL W P-5'-P-CCNC: 14 U/L (ref 0–45)
BILIRUB SERPL-MCNC: 1.1 MG/DL (ref 0.2–1.3)
BUN SERPL-MCNC: 15 MG/DL (ref 7–30)
CALCIUM SERPL-MCNC: 9.8 MG/DL (ref 8.5–10.1)
CHLORIDE BLD-SCNC: 108 MMOL/L (ref 94–109)
CHOLEST SERPL-MCNC: 194 MG/DL
CO2 SERPL-SCNC: 22 MMOL/L (ref 20–32)
CREAT SERPL-MCNC: 0.62 MG/DL (ref 0.52–1.04)
CREAT UR-MCNC: 125 MG/DL
DEPRECATED CALCIDIOL+CALCIFEROL SERPL-MC: 32 UG/L (ref 20–75)
FASTING STATUS PATIENT QL REPORTED: YES
GFR SERPL CREATININE-BSD FRML MDRD: 83 ML/MIN/1.73M2
GLUCOSE BLD-MCNC: 101 MG/DL (ref 70–99)
HBA1C MFR BLD: 5.5 % (ref 0–5.6)
HDLC SERPL-MCNC: 67 MG/DL
LDLC SERPL CALC-MCNC: 109 MG/DL
MICROALBUMIN UR-MCNC: 84 MG/L
MICROALBUMIN/CREAT UR: 67.2 MG/G CR (ref 0–25)
NONHDLC SERPL-MCNC: 127 MG/DL
POTASSIUM BLD-SCNC: 3.9 MMOL/L (ref 3.4–5.3)
PROT SERPL-MCNC: 7.7 G/DL (ref 6.8–8.8)
SODIUM SERPL-SCNC: 139 MMOL/L (ref 133–144)
TRIGL SERPL-MCNC: 92 MG/DL

## 2022-01-01 PROCEDURE — 82306 VITAMIN D 25 HYDROXY: CPT

## 2022-01-01 PROCEDURE — 99215 OFFICE O/P EST HI 40 MIN: CPT | Mod: 95 | Performed by: FAMILY MEDICINE

## 2022-01-01 PROCEDURE — 80061 LIPID PANEL: CPT

## 2022-01-01 PROCEDURE — 82043 UR ALBUMIN QUANTITATIVE: CPT

## 2022-01-01 PROCEDURE — 80053 COMPREHEN METABOLIC PANEL: CPT

## 2022-01-01 PROCEDURE — 83036 HEMOGLOBIN GLYCOSYLATED A1C: CPT

## 2022-01-01 PROCEDURE — 36415 COLL VENOUS BLD VENIPUNCTURE: CPT

## 2022-02-15 NOTE — TELEPHONE ENCOUNTER
[facility] has been involved for one month.  , alfonso Ellison.  [phone]    PT can't leave the home d/t steps.    Home care wanted to note a wound.  On left thigh.  Quarter sized. Escar. Raised bed. 3 mm deep.  No redness or tenderness.  Pt says it has been there a while. PT says from a bee bite a few months ago. (Not sure on this accuracy. PT reported.)  Noted this during bath.  Not bothering pt.    Daughter was asking for a BMP to be done.  Home care can draw labs. Would need an order.    Of note, pt was last seen in clinic in 2016.  Her wt at that time was 118 lb  8/2/2016.  Home care reports a wt of 94 lbs on 2/15/2021  PT has poor appetite.    Dr. Villagran- please advise.  Last virtual visit was 8/17/21.  KEIRY Cardoza

## 2022-02-15 NOTE — TELEPHONE ENCOUNTER
She is due for labs below, I ordered them, it would be great if home care could draw them. I'm not sure if she is currently getting hospice care or not, could you check with the family? Thank you!    Sincerely,  Dr. Jo Ann Villagran MD  2/15/2022      ASSESSMENT / PLAN:  (Z13.6) CARDIOVASCULAR SCREENING; LDL GOAL LESS THAN 160  (primary encounter diagnosis)  Plan: Lipid panel reflex to direct LDL Fasting,         Comprehensive metabolic panel    (R73.01) Impaired fasting glucose  Plan: Comprehensive metabolic panel, Hemoglobin A1c    (R80.9) Microalbuminuria  Plan: Albumin Random Urine Quantitative with Creat         Ratio    (I10) HTN, goal below 150/90  Plan: Albumin Random Urine Quantitative with Creat         Ratio, Comprehensive metabolic panel    (E55.9) Vitamin D deficiency  Plan: Vitamin D Deficiency

## 2022-02-17 NOTE — TELEPHONE ENCOUNTER
[facility]     Their fax number is [phone].    Dr. Tyshawn WATTS patient is NOT currently getting hospice care.    [facility] RN asks which tubes are needed for these tests?  RNs phone number in case there are questions: [phone].    Faxed orders with tube colors to [facility].    Анна Jackson RN  [facility]

## 2022-02-17 NOTE — TELEPHONE ENCOUNTER
Left message for Danika  with [facility] stating PCP ordered labs, where do they need these sent to? Pls call back with fax #.     Once received fax #, lab orders can be sent over for home care to draw.     RN tried calling Damaris, daughter (consent to communicate on file), left message to call back and ask to speak with a triage nurse to clarify if patient is currently getting hospice care or not.     MAURO GastelumN RN  [facility]

## 2022-03-01 NOTE — TELEPHONE ENCOUNTER
Hi, recent labs were normal (CMP, A1C, microalbumin). I think the next step is to see if she and her family want to pursue a work up for weight loss or if they would like to determine if she is a candidate for hospice.    I had a virtual visit with her on 8/17/21, which was largely with family members.    Could you call and see if they want to set up a family virtual meeting? Thank you!    Sincerely,  Dr. Jo Ann Villagran MD  3/1/2022

## 2022-03-01 NOTE — TELEPHONE ENCOUNTER
Damaris called back.     She said she thinks she's not eating because a mix of things: her dementia and forgetting to eat and the fact that there are so many people (home care) in and out of their house.     She agreed to virtual visit to discuss options, scheduled for 3/8/22    ALVA Gastelum RN  [facility]

## 2022-03-01 NOTE — TELEPHONE ENCOUNTER
Writer called Terra and reviewed message/plan per Dr. Villagran.    Terra verbalized understanding and in agreement with plan.  Terra suggested writer call patient's daughter, Damaris.    Consent to communicate with daughter, Damaris, on file.    Writer called Damaris: Left message to call back and ask to speak with an available triage nurse.    MAURO HardwickN, RN  [facility]

## 2022-03-01 NOTE — TELEPHONE ENCOUNTER
See my other telephone note, also from today, as I have not had any contact with Radha or her family several months, it sounds like Radha's health is deteriorating, and we need to have a family meeting. A virtual visit would be fine.    She has mild kidney disease, managing blood pressure and cholesterol is the best way to keep the kidneys healthy, as well as making sure she's staying hydrated.    Sincerely,  Dr. Jo Ann Villagran MD  3/1/2022          Lab Results   Component Value Date    MICROL 84 02/25/2022    MICROL 20 08/02/2016     No results found for: MICROALBUMIN  GFR Estimate   Date Value Ref Range Status   02/25/2022 83 >60 mL/min/1.73m2 Final     Comment:     Effective December 21, 2021 eGFRcr in adults is calculated using the 2021 CKD-EPI creatinine equation which includes age and gender (Jennifer pelayo al., NEJ, DOI: 10.1056/PKTKes9271032)   08/02/2016 84 >60 mL/min/1.7m2 Final     Comment:     Non  GFR Calc   07/09/2015 >90  Non  GFR Calc   >60 mL/min/1.7m2 Final   08/01/2013 80 >60 mL/min/1.7m2 Final     GFR Estimate If Black   Date Value Ref Range Status   08/02/2016 >90   GFR Calc   >60 mL/min/1.7m2 Final   07/09/2015 >90   GFR Calc   >60 mL/min/1.7m2 Final   08/01/2013 >90 >60 mL/min/1.7m2 Final

## 2022-03-01 NOTE — TELEPHONE ENCOUNTER
Sent Jaschat message to Damaris (proxy for Radha) relaying this message. Family meeting is set up virtually for 3/8.     MAURO GastelumN RN  [facility]'

## 2022-03-01 NOTE — TELEPHONE ENCOUNTER
Damaris, patient's daughter calling about patient's abnormal lab result.  CTC in media  Urine albumin 67.20 mg/g Cr.  Daughter would like to know what this is due to.  Writer will route message to PCP/care team.    Grace Mcclain RN  [facility] Nurse Advisor  4:01 PM 3/1/2022

## 2022-03-01 NOTE — TELEPHONE ENCOUNTER
"Nurse  Terra from [facility] calling for lab results done on 2/25/22.  Reports that pt has continued to lose weight, is down to 90.6# from 94# \"a couple weeks ago.\"     Terra sees Radha twice a week for showers and vitals, has been involved in care for 4-6 weeks.  Terra is private pay so increasing visits isn't immediate option. Terra states she brings her chicken nuggets from [employer] as pt \"really likes them, but she eats two and then says she's full.\"  Pt not seen in clinic for 6 years.  Family brings her food, can manage alone as her home is all on one floor, but has difficulty leaving due to stairs.      Labs not yet reviewed.     Terra is wondering about next steps?  Appetite stimulant?  Supplements?  Another metabolic process we're missing?  Or just encourage increased intake?     [phone] Terra cell - confidential VM.    Mariam Nicolas RN  [facility]      "

## 2022-03-08 PROBLEM — F01.50 VASCULAR DEMENTIA WITHOUT BEHAVIORAL DISTURBANCE (H): Status: ACTIVE | Noted: 2022-01-01

## 2022-03-08 NOTE — PROGRESS NOTES
Radha is a 89 year old who is being evaluated via a billable video visit.      How would you like to obtain your AVS? MyChart  If the video visit is dropped, the invitation should be resent by: Text to cell phone: [phone]  Will anyone else be joining your video visit? Yes: Daughter, 2 sons Ralf and Yunier, daughter in law Florecita. How would they like to receive their invitation? Other e-mail: see demographics  Video Start Time: 1:47 PM    Assessment & Plan     (R63.4) Weight loss  (primary encounter diagnosis)  (Z71.0) Encounter for family conference without patient present  (F01.50) vascular dementia without behavioral disturbance    Now gaining weight, doing really well, great support network of home visiting nurse, adult children, no obvious risks to being at home. Recommend continuing current course, I don't recommend any additional testing or specialists unless certainly if they'd like a referral.    More than 50% of this 40 minute visit was spent in family meeting as noted above.       Comment: discussed home safety, health concerns    Return in about 3 months (around 6/8/2022) for follow up.    Jo Ann Villagran MD  [facility]    Subjective   Radha is a 89 year old who presents for the following health issues:    HPI     Radha will not be present. This is a family video call with daughter, both sons and daughter in law to discuss pt's weight loss and advice.  Lives by herself in her own home of over 60 years, has 4 children in the area who check on her, including Ralf and Yunier,and Damaris. She doesn't leave because she won't go up and down stairs, so they're not worried about her wandering. She's been getting regular foot baths and toenail trims, walking better now, and not reporting any pain. Her mood seems improved.  Her children visit her but don't eat meals with her. She has a life[employer] button, and has used it appropriately.  She does change her own clothes, nurse gives her  a bath. She can manage getting to the bathroom by herself.    Weight loss: 94.2 lbs, down from baseline of 100 lbs, although has been gaining weight recently with new nurse labeling and providing packaged meals for her to remind her to eat. She doesn't remember to eat, doesn't seem to be hungry. She can manage to heat up prepackaged meals in a microwave, but seems to forget about them. She eats crackers, does have boost 1-2 times per day.    She has dementia, no new changes.  Biggest sign of this is difficulty keeping up with housework, big change from prior very neat.      Review of Systems   Constitutional, HEENT, cardiovascular, pulmonary, GI, , musculoskeletal, neuro, skin, endocrine and psych systems are negative, except as otherwise noted.      Objective           Vitals:  No vitals were obtained today due to virtual visit/family visit    Physical Exam       Video-Visit Details    Type of service:  Video Visit    Video End Time:2:26 PM    Originating Location (pt. Location): Home    Distant Location (provider location):  [facility]     Platform used for Video Visit: [employer]

## 2022-04-06 NOTE — TELEPHONE ENCOUNTER
Called KEIRY Garnica and voice mail is full.  Recall 4/7/22.    GÓMEZ Nicolas RN  [facility]

## 2022-04-06 NOTE — TELEPHONE ENCOUNTER
From Dr Villagran last note it seems like patient was doing well.  Continue with protein supplement if she is not doing already and it should wait until Dr Villagran arrival to decide if she would recommend any appetite stimulants.

## 2022-04-06 NOTE — TELEPHONE ENCOUNTER
Providers -    [facility] home care RN calling. Patient hasn't been seen in a while as she is homebound. Last virtual visit 3/8/22 with Dr. Villagran to address weight loss.  Weight at that time in pounds was in the 90s.  Weight a week ago on 3/28 was 94.2 lbs with a blood pressure of 180/100.  Yesterday, weight was down to 88 lbs and blood pressure was 140/80.  [facility] RN requests an appetite stimulant if appropriate. Please advise.    Анна Jackson RN  [facility]

## 2022-04-11 NOTE — TELEPHONE ENCOUNTER
Tried calling Danika again, VM still full.     Called [facility]. Let them know we have been trying to reach KEIRY Garnica case manager and VM box full.     They got me transferred to KEIRY Garnica. I relayed the message to her from Dr. Griffin. She will clean out her VM box.     She noted that Radha's family would like her to be on an appetite supplement if possibe- pharmacy pended.    Uzma Norman, MAURON RN  [facility]

## 2022-07-05 NOTE — TELEPHONE ENCOUNTER
Reason for Call: Other call back    Detailed comments: [facility] states the patient is continuing to lose weight, has decreased appetite, more tired etc. States they did discuss with the family option of hospice care, she is now 86 lbs. Wondering if PCP would be willing to do home visits? Thinks that would be be helpful. Suggestions? Complaining of intermittent ear pain on right side as well, advised to not use q-tips and see if that helps. Please follow up. Thanks!    Phone Number Patient can be reached at: [phone]    Best Time: Any    Can we leave a detailed message on this number? YES    Call taken on 7/5/2022 at 11:29 AM by Monae Nina

## 2022-07-06 NOTE — TELEPHONE ENCOUNTER
Home Care called. Pt is getting twice a week RN private pay. Pt was resisitant to care in the home and now open to it. Danika has been seeing her since January. States pt cannot leave the house. CTC on file with daughter Damaris.    This RN advised appt for virtual visit with any provider in Dr Villagran absence. Danika will relay this to daughter and she will call for virtual visit and assist her mother    Charlene Gayle RN, BSN  [facility]

## 2022-09-07 NOTE — TELEPHONE ENCOUNTER
Reason for Call: Other call back    Detailed comments: [facility] states -  Patients health continually declining  Down to 79 lbs  Access to food and water in home but not eating much  Refusing visit    Anything additional PCP would like HC to do?  Adamantly refusing hospice care    Phone Number Patient can be reached at: [phone]    Best Time: Any    Can we leave a detailed message on this number? YES    Call taken on 9/7/2022 at 12:28 PM by Monae Nina

## 2022-09-08 NOTE — TELEPHONE ENCOUNTER
"    I called Danika at [phone]  Radha is not eating at all. Danika visits her twice per week Tuesday and Thursday, bathes her, cleans and trims her nails every visit. Danika suspects she has diarrhea but underwear is clean. Abdomen nontender.  Family goals are to keep her \"comfortable and happy\" but don't want to have her have officially on hospice status. Her son comes every Friday to help grocery shop and another son visits on Sundays. She is possibly estranged from her daughter.  She has a  come every other week on Mondays, and Danika reports her house is spotless. Her house is well kept and she has access to food and water. She has a can of Boost daily.  She is fully ambulatory, hasn't fallen in 6 months, she does have a life-alert button. She has a phone and calls Danika when she needs something. Her memory seems intact.  She doesn't want any more help or visitors. She's declining any office visits, even virtual visits. Her vitals have been normal/stable.    She does have an advanced directive, Danika will get a copy to my office. She doesn't want CPR or other treatments if she was dying or didn't have a reasonable expectation of recovery. Her POA is her son who lives out of state.    It's fine to have her on a comfort measures without officially having her on hospice, that seems to be where she is. It is concerning that she might have colon cancer or some other malignancy.  She doesn't want assisted living, she wants as much support in the home to stay in the home.    I'm happy to sign a POLST. She will fax this to me.    Past Medical History:   Diagnosis Date     Blindness      Family history of diabetes mellitus      Fuchs' heterochromic cyclitis      Hypertension goal BP (blood pressure) < 140/90      Impaired fasting glucose 7/20/2015     Malignant neoplasm of colon, unspecified site 1980s     Osteoporosis, unspecified     18/6.4% FRAX 2010     Unspecified visual loss       Only " medication is occasional acetaminophen.    Past Surgical History:   Procedure Laterality Date     COLECTOMY PARTIAL  01/01/1987    colon ca resection      Sincerely,  Dr. Jo Ann Villagran MD  9/9/2022

## 2022-11-08 NOTE — TELEPHONE ENCOUNTER
I agree with assessment and plan below, thanks! I'll sign form when I'm in clinic.  Jo Ann Villagran MD  11/8/2022

## 2022-11-08 NOTE — TELEPHONE ENCOUNTER
Forms/Letter Request    Type of form/letter: POLST form - pt wishes to be a DNR    Have you been seen for this request: Yes    Do we have the form/letter: No faxing over today     When is form/letter needed by: ASAP    How would you like the form/letter returned: Fax    Patient Notified form requests are processed in 3-5 business days:Yes    Could we send this information to you in [facility] or would you prefer to receive a phone call?:   Patient would prefer a phone call       Okay to leave a detailed message?: Yes at [phone] [place]      Call taken on 11/8/22 at 1250 Pm by LUIS Elizabeth

## 2022-11-10 NOTE — TELEPHONE ENCOUNTER
Completed, signed forms placed in MA basket today.  POLST, DNR, code status updated in chart today.  Sincerely,  Dr. Jo Ann Villagran MD  11/10/2022    9:45 AM

## 2022-11-11 NOTE — TELEPHONE ENCOUNTER
See my note from 11/8/2022:  Completed, signed forms placed in MA basket today.  POLST, DNR, code status updated in chart today.  Sincerely,  Dr. Jo Ann Villagran MD  11/10/2022    9:45 AM

## 2022-11-11 NOTE — TELEPHONE ENCOUNTER
[facility] home care nurse Danika hamilton wanted to update PCP. Reports a POLST was faxed wondering if PCP received and signed this.     Also wanted to update client would benefit from hospice with patient refusing. Lives at home independently, cognitively alert and oriented but declining with mobility, patient wants to stay at home with family also wanting patient to stay at home. Nurse visits twice weekly, patient has life alert knows how to use it. Reports she is still able to be at home but mobility has decreased, refusing all visits with doctors, virtual or in person.  Trying to maintain best uality of life for her. Nurse wanted to know status of POLST and update PCP. Please advise.     Danica Green RN 11/11/22 1:34 PM   [facility] Triage Nurse Advisor

## 2022-11-14 NOTE — TELEPHONE ENCOUNTER
See 11/8/22 telephone encounter.    POLST was faxed on 11/10/22.    Writer called Danika and informed of POLST faxed to [phone].    Per Danika, BRAULIO to be faxed [phone], ATTN: Danika.    POLST faxed to [phone], ATTN: Danika.    MAURO HardwickN, RN-BC  [facility]

## 2023-01-01 ENCOUNTER — TELEPHONE (OUTPATIENT)
Dept: NURSING | Facility: CLINIC | Age: 88
End: 2023-01-01
Payer: COMMERCIAL

## 2023-01-28 NOTE — TELEPHONE ENCOUNTER
Pharmacy phoning and wanting pt med list faxed - transferred call to [facility]. Pt uses [facility]     No triage     Bessy Sands RN  [place] Nurse Advisor  11:17 AM 1/28/2023